# Patient Record
Sex: FEMALE | Race: BLACK OR AFRICAN AMERICAN | NOT HISPANIC OR LATINO | URBAN - METROPOLITAN AREA
[De-identification: names, ages, dates, MRNs, and addresses within clinical notes are randomized per-mention and may not be internally consistent; named-entity substitution may affect disease eponyms.]

---

## 2023-08-11 ENCOUNTER — OFFICE VISIT (OUTPATIENT)
Dept: URBAN - METROPOLITAN AREA CLINIC 86 | Facility: CLINIC | Age: 18
End: 2023-08-11
Payer: COMMERCIAL

## 2023-08-11 ENCOUNTER — WEB ENCOUNTER (OUTPATIENT)
Dept: URBAN - METROPOLITAN AREA CLINIC 86 | Facility: CLINIC | Age: 18
End: 2023-08-11

## 2023-08-11 VITALS
HEIGHT: 71 IN | TEMPERATURE: 98.2 F | HEART RATE: 98 BPM | WEIGHT: 250 LBS | SYSTOLIC BLOOD PRESSURE: 97 MMHG | DIASTOLIC BLOOD PRESSURE: 67 MMHG | BODY MASS INDEX: 35 KG/M2

## 2023-08-11 DIAGNOSIS — R74.8 ELEVATED LIVER ENZYMES: ICD-10-CM

## 2023-08-11 DIAGNOSIS — Z34.90 PREGNANCY, UNSPECIFIED GESTATIONAL AGE: ICD-10-CM

## 2023-08-11 DIAGNOSIS — Z71.85 VACCINE COUNSELING: ICD-10-CM

## 2023-08-11 PROCEDURE — 99214 OFFICE O/P EST MOD 30 MIN: CPT | Performed by: PHYSICIAN ASSISTANT

## 2023-08-11 NOTE — HPI-TODAY'S VISIT:
18 yo female previously seeing Dr. Dion Zamora for elevated liver enzymes in pregnancy presents today for further evaluation. A copy of the note will be sent to Dr. Dion Zamora as well as her OBGYN, .   She is seeing [place]    23 ov with Dr zamora ast 68 alt 111 tb 0.2. per notes startedin 2023. 30 weeks gestatation. hav negative igm, igg pos.  platlets 247  bile acids normal hcv negative ggt 11, inr 1.0 discussed the prenatal and taking alive and has beets in it an dmaybe try another asked about diet and she is working on this and was eating out a lto so could be affecting this she said the recent labs were lower need to do the weekly labs  recap 23 labs with ast 95 alt 159 tb   2023 us COMPARISON: None.   FINDINGS:    Liver: Normal echogenicity. No lesions. Portal vein is patent. Hepatic veins are patent.   Bile Ducts: No dilated intrahepatic biliary radicles. Common bile duct measures 4 mm.   Gallbladder: Normal. Mercer's sign is negative.   Pancreas: Obscured by overlying bowel gas.   Right Kidney: Measures 12.7 cm. Normal echogenicity. Mild pelvocaliectasis without iliana hydronephrosis.   Other: None.   IMPRESSION:   1. No evidence of acute cholecystitis or cholelithiasis. 2. Mild right pelvocaliectasis without iliana hydronephrosis.

## 2023-08-14 ENCOUNTER — LAB OUTSIDE AN ENCOUNTER (OUTPATIENT)
Dept: URBAN - METROPOLITAN AREA CLINIC 86 | Facility: CLINIC | Age: 18
End: 2023-08-14

## 2023-08-15 ENCOUNTER — WEB ENCOUNTER (OUTPATIENT)
Dept: URBAN - METROPOLITAN AREA CLINIC 86 | Facility: CLINIC | Age: 18
End: 2023-08-15

## 2023-08-15 ENCOUNTER — TELEPHONE ENCOUNTER (OUTPATIENT)
Dept: URBAN - METROPOLITAN AREA CLINIC 86 | Facility: CLINIC | Age: 18
End: 2023-08-15

## 2023-08-15 LAB
A/G RATIO: 1.4
ALBUMIN: 3.6
ALKALINE PHOSPHATASE: 108
ALT (SGPT): 96
AST (SGOT): 69
BASO (ABSOLUTE): 0.1
BASOS: 1
BILIRUBIN, TOTAL: 0.4
BUN/CREATININE RATIO: 10
BUN: 6
CALCIUM: 9.3
CARBON DIOXIDE, TOTAL: 17
CHLORIDE: 102
CREATININE: 0.62
EGFR: 131
EOS (ABSOLUTE): 0.1
EOS: 1
GLOBULIN, TOTAL: 2.6
GLUCOSE: 59
HEMATOCRIT: 38.5
HEMATOLOGY COMMENTS:: (no result)
HEMOGLOBIN: 12.8
IMMATURE CELLS: (no result)
IMMATURE GRANS (ABS): 0.1
IMMATURE GRANULOCYTES: 1
LYMPHS (ABSOLUTE): 2.2
LYMPHS: 24
MCH: 31.4
MCHC: 33.2
MCV: 94
MONOCYTES(ABSOLUTE): 0.6
MONOCYTES: 6
NEUTROPHILS (ABSOLUTE): 6.2
NEUTROPHILS: 67
NRBC: (no result)
PLATELETS: 249
POTASSIUM: 3.9
PROTEIN, TOTAL: 6.2
RBC: 4.08
RDW: 13.4
SODIUM: 136
WBC: 9.2

## 2023-08-15 NOTE — HPI-TODAY'S VISIT:
Dear Lili Wilkerson,   The recent labs were sent to us. The bilirubin 9.4, alkaline phosphatase 108, ast 69, alt 96. This is higher than before and we need to continue to monitor this. The platelets 249.  We will see what the next set shows.   Roseline White PA-C

## 2023-08-17 LAB
A/G RATIO: 1.3
ACTIN (SMOOTH MUSCLE) ANTIBODY: 25
ALBUMIN: 3.5
ALKALINE PHOSPHATASE: 104
ALPHA-1-ANTITRYPSIN, SERUM: 222
ALT (SGPT): 91
ANA DIRECT: NEGATIVE
AST (SGOT): 70
BASO (ABSOLUTE): 0.1
BASOS: 1
BILIRUBIN, TOTAL: 0.4
BUN/CREATININE RATIO: 12
BUN: 7
CALCIUM: 8.8
CARBON DIOXIDE, TOTAL: 19
CHLORIDE: 105
CREATININE: 0.58
EGFR: 134
EOS (ABSOLUTE): 0.1
EOS: 1
FERRITIN, SERUM: 147
GLOBULIN, TOTAL: 2.6
GLUCOSE: 65
HEMATOCRIT: 39.7
HEMATOLOGY COMMENTS:: (no result)
HEMOGLOBIN: 12.9
HEPATITIS B SURF AB QUANT: <3.1
IMMATURE CELLS: (no result)
IMMATURE GRANS (ABS): 0.1
IMMATURE GRANULOCYTES: 1
IMMUNOGLOBULIN A, QN, SERUM: 178
IMMUNOGLOBULIN E, TOTAL: 65
IMMUNOGLOBULIN G, QN, SERUM: 1048
IMMUNOGLOBULIN M, QN, SERUM: 91
INR: 1
IRON BIND.CAP.(TIBC): 467
IRON SATURATION: 18
IRON: 82
LYMPHS (ABSOLUTE): 2
LYMPHS: 23
MCH: 30.9
MCHC: 32.5
MCV: 95
MITOCHONDRIAL (M2) ANTIBODY: <20
MONOCYTES(ABSOLUTE): 0.6
MONOCYTES: 7
NEUTROPHILS (ABSOLUTE): 6.1
NEUTROPHILS: 67
NRBC: (no result)
PHENOTYPE (PI): (no result)
PLATELETS: 262
POTASSIUM: 4.2
PROTEIN, TOTAL: 6.1
PROTHROMBIN TIME: 10.4
RBC: 4.18
RDW: 13.7
SODIUM: 137
UIBC: 385
WBC: 8.9

## 2023-08-18 ENCOUNTER — OFFICE VISIT (OUTPATIENT)
Dept: URBAN - METROPOLITAN AREA TELEHEALTH 2 | Facility: TELEHEALTH | Age: 18
End: 2023-08-18
Payer: COMMERCIAL

## 2023-08-18 VITALS — WEIGHT: 250 LBS | BODY MASS INDEX: 35 KG/M2 | HEIGHT: 71 IN

## 2023-08-18 DIAGNOSIS — Z71.85 VACCINE COUNSELING: ICD-10-CM

## 2023-08-18 DIAGNOSIS — R74.8 ELEVATED LIVER ENZYMES: ICD-10-CM

## 2023-08-18 DIAGNOSIS — Z34.90 PREGNANCY, UNSPECIFIED GESTATIONAL AGE: ICD-10-CM

## 2023-08-18 PROBLEM — 707724006: Status: ACTIVE | Noted: 2023-08-18

## 2023-08-18 PROBLEM — 77386006: Status: ACTIVE | Noted: 2023-08-18

## 2023-08-18 PROBLEM — 443913008: Status: ACTIVE | Noted: 2023-08-18

## 2023-08-18 PROCEDURE — 99214 OFFICE O/P EST MOD 30 MIN: CPT

## 2023-08-18 NOTE — HPI-TODAY'S VISIT:
16 yo female previously seeing Dr. Dion Zamora for elevated liver enzymes in pregnancy presents today for further evaluation. A copy of the note will be sent to Dr. Dion Zamora as well as her OBGYN, Dr.   23 telemed  23 labs The bilirubin 0.4, alkaline phosphatase 108, ast 69, alt 96. This is higher than before and we need to continue to monitor this. The platelets 249.   immunoglobulins normal, tibc 467, iron 82, 18% saturation,ferritin 147, not immune to hbv, asma 25, ama negative, alpha 1 mm, johann negative, rbc 4.18, hgb 12.8, mcv 95, platelets 262, inr 1.0  seems she may have given the lab all of her slips last week so didn't do labs this week. she said she can go today to do them recap She is seeing [place]    23 ov with Dr zamora ast 68 alt 111 tb 0.2. per notes startedin 2023. 30 weeks gestatation. hav negative igm, igg pos.  platlets 247  bile acids normal hcv negative ggt 11, inr 1.0 discussed the prenatal and taking alive and has beets in it an dmaybe try another asked about diet and she is working on this and was eating out a lto so could be affecting this she said the recent labs were lower need to do the weekly labs  recap 23 labs with ast 95 alt 159 tb   2023 us COMPARISON: None.   FINDINGS:    Liver: Normal echogenicity. No lesions. Portal vein is patent. Hepatic veins are patent.   Bile Ducts: No dilated intrahepatic biliary radicles. Common bile duct measures 4 mm.   Gallbladder: Normal. Mercer's sign is negative.   Pancreas: Obscured by overlying bowel gas.   Right Kidney: Measures 12.7 cm. Normal echogenicity. Mild pelvocaliectasis without iliana hydronephrosis.   Other: None.   IMPRESSION:   1. No evidence of acute cholecystitis or cholelithiasis. 2. Mild right pelvocaliectasis without iliana hydronephrosis.

## 2023-08-22 ENCOUNTER — WEB ENCOUNTER (OUTPATIENT)
Dept: URBAN - METROPOLITAN AREA CLINIC 86 | Facility: CLINIC | Age: 18
End: 2023-08-22

## 2023-08-22 ENCOUNTER — TELEPHONE ENCOUNTER (OUTPATIENT)
Dept: URBAN - METROPOLITAN AREA CLINIC 86 | Facility: CLINIC | Age: 18
End: 2023-08-22

## 2023-08-22 LAB
A/G RATIO: 1.2
ALBUMIN: 3.5
ALKALINE PHOSPHATASE: 121
ALT (SGPT): 70
AST (SGOT): 54
BASO (ABSOLUTE): 0.1
BASOS: 1
BILIRUBIN, TOTAL: 0.4
BUN/CREATININE RATIO: 14
BUN: 8
CALCIUM: 8.9
CARBON DIOXIDE, TOTAL: 18
CHLORIDE: 102
CREATININE: 0.56
EGFR: 135
EOS (ABSOLUTE): 0.1
EOS: 1
GLOBULIN, TOTAL: 2.9
GLUCOSE: 56
HEMATOCRIT: 39.6
HEMATOLOGY COMMENTS:: (no result)
HEMOGLOBIN: 13.1
IMMATURE CELLS: (no result)
IMMATURE GRANS (ABS): 0.1
IMMATURE GRANULOCYTES: 1
LYMPHS (ABSOLUTE): 2.2
LYMPHS: 21
MCH: 31.4
MCHC: 33.1
MCV: 95
MONOCYTES(ABSOLUTE): 0.7
MONOCYTES: 7
NEUTROPHILS (ABSOLUTE): 7.3
NEUTROPHILS: 69
NRBC: (no result)
PLATELETS: 247
POTASSIUM: 4.1
PROTEIN, TOTAL: 6.4
RBC: 4.17
RDW: 13.5
SODIUM: 140
WBC: 10.4

## 2023-08-22 NOTE — HPI-TODAY'S VISIT:
Dear Lili,   The 8/21/23 labs were sent to me.  The glucose 56, and this is low and I suspect you are fasting but you may want to mention this to the OB/GYN.  Creatinine 0.56, sodium 140, potassium 4.1, alkaline phosphatase 121, AST 54, ALT 70, bilirubin 0.4.  The complete blood count was normal other than though neutrophils slightly elevated at 7.3 and upper limits of normal is 7.0.  This can happen with pregnancy.  Previously on August 14 the alkaline phosphatase 108, AST 69, ALT 96.  The AST and ALT are slightly lower now but curious as to why the alkaline phosphatase was a little bit higher and we can monitor this.  Thanks for doing the labs as this allows us to safely monitor you.  Roseline White PA-C

## 2023-08-25 ENCOUNTER — CLAIMS CREATED FROM THE CLAIM WINDOW (OUTPATIENT)
Dept: URBAN - METROPOLITAN AREA TELEHEALTH 2 | Facility: TELEHEALTH | Age: 18
End: 2023-08-25
Payer: COMMERCIAL

## 2023-08-25 VITALS — BODY MASS INDEX: 35 KG/M2 | WEIGHT: 250 LBS | HEIGHT: 71 IN

## 2023-08-25 DIAGNOSIS — R74.8 ELEVATED LIVER ENZYMES: ICD-10-CM

## 2023-08-25 DIAGNOSIS — Z34.90 PREGNANCY, UNSPECIFIED GESTATIONAL AGE: ICD-10-CM

## 2023-08-25 DIAGNOSIS — Z71.85 VACCINE COUNSELING: ICD-10-CM

## 2023-08-25 PROCEDURE — 99214 OFFICE O/P EST MOD 30 MIN: CPT

## 2023-08-25 PROCEDURE — 99214 OFFICE O/P EST MOD 30 MIN: CPT | Performed by: PHYSICIAN ASSISTANT

## 2023-08-25 NOTE — HPI-TODAY'S VISIT:
16 yo female previously seeing Dr. Dion Zamora for elevated liver enzymes in pregnancy presents today for further evaluation. A copy of the note will be sent to Dr. Dion Zamora as well as her OBGYN, .   23 labs were sent to me.  The glucose 56, and this is low and I suspect you are fasting but you may want to mention this to the OB/GYN.  Creatinine 0.56, sodium 140, potassium 4.1, alkaline phosphatase 121, AST 54, ALT 70, bilirubin 0.4.  The complete blood count was normal other than though neutrophils slightly elevated at 7.3 and upper limits of normal is 7.0 which can be seen inpregnancy but still recommend sharing [facility] obgyn/mfm Previously on  the alkaline phosphatase 108, AST 69, ALT 96.  The AST and ALT are slightly lower and alp up and this can due to the placenta as this can increase in pregnancy especially close to delivery which she is  She has fu with mfm next week on  She is trying to eat healthier cautioned re blood sugar and share wt mfm she is 34 weeks pregnant  recap 23 telemed  23 labs The bilirubin 0.4, alkaline phosphatase 108, ast 69, alt 96. This is higher than before and we need to continue to monitor this. The platelets 249.   immunoglobulins normal, tibc 467, iron 82, 18% saturation,ferritin 147, not immune to hbv, asma 25, ama negative, alpha 1 mm, johann negative, rbc 4.18, hgb 12.8, mcv 95, platelets 262, inr 1.0  seems she may have given the lab all of her slips last week so didn't do labs this week. she said she can go today to do them recap She is seeing [place]    23 ov with Dr zamora ast 68 alt 111 tb 0.2. per notes startedin 2023. 30 weeks gestatation. hav negative igm, igg pos.  platlets 247  bile acids normal hcv negative ggt 11, inr 1.0 discussed the prenatal and taking alive and has beets in it an dmaybe try another asked about diet and she is working on this and was eating out a lto so could be affecting this she said the recent labs were lower need to do the weekly labs  recap 23 labs with ast 95 alt 159 tb   2023 us COMPARISON: None.   FINDINGS:    Liver: Normal echogenicity. No lesions. Portal vein is patent. Hepatic veins are patent.   Bile Ducts: No dilated intrahepatic biliary radicles. Common bile duct measures 4 mm.   Gallbladder: Normal. Mercer's sign is negative.   Pancreas: Obscured by overlying bowel gas.   Right Kidney: Measures 12.7 cm. Normal echogenicity. Mild pelvocaliectasis without iliana hydronephrosis.   Other: None.   IMPRESSION:   1. No evidence of acute cholecystitis or cholelithiasis. 2. Mild right pelvocaliectasis without iliana hydronephrosis.

## 2023-08-28 ENCOUNTER — LAB OUTSIDE AN ENCOUNTER (OUTPATIENT)
Dept: URBAN - METROPOLITAN AREA TELEHEALTH 2 | Facility: TELEHEALTH | Age: 18
End: 2023-08-28

## 2023-08-31 ENCOUNTER — WEB ENCOUNTER (OUTPATIENT)
Dept: URBAN - METROPOLITAN AREA CLINIC 86 | Facility: CLINIC | Age: 18
End: 2023-08-31

## 2023-09-04 ENCOUNTER — LAB OUTSIDE AN ENCOUNTER (OUTPATIENT)
Dept: URBAN - METROPOLITAN AREA TELEHEALTH 2 | Facility: TELEHEALTH | Age: 18
End: 2023-09-04

## 2023-09-10 LAB
A/G RATIO: 1.4
ALBUMIN: 3.3
ALKALINE PHOSPHATASE: 143
ALT (SGPT): 33
AST (SGOT): 27
BASO (ABSOLUTE): 0.1
BASOS: 1
BILIRUBIN, TOTAL: 0.3
BUN/CREATININE RATIO: 13
BUN: 9
CALCIUM: 8.8
CARBON DIOXIDE, TOTAL: 18
CHLORIDE: 106
CREATININE: 0.69
EGFR: 128
EOS (ABSOLUTE): 0.1
EOS: 1
GLOBULIN, TOTAL: 2.4
GLUCOSE: 69
HEMATOCRIT: 39.8
HEMATOLOGY COMMENTS:: (no result)
HEMOGLOBIN: 13.1
IMMATURE CELLS: (no result)
IMMATURE GRANS (ABS): 0.1
IMMATURE GRANULOCYTES: 1
LYMPHS (ABSOLUTE): 2.2
LYMPHS: 23
MCH: 31.1
MCHC: 32.9
MCV: 95
MONOCYTES(ABSOLUTE): 0.6
MONOCYTES: 7
NEUTROPHILS (ABSOLUTE): 6.6
NEUTROPHILS: 67
NRBC: (no result)
PLATELETS: 235
POTASSIUM: 4.6
PROTEIN, TOTAL: 5.7
RBC: 4.21
RDW: 13.7
SODIUM: 140
WBC: 9.7

## 2023-09-11 ENCOUNTER — TELEPHONE ENCOUNTER (OUTPATIENT)
Dept: URBAN - METROPOLITAN AREA CLINIC 86 | Facility: CLINIC | Age: 18
End: 2023-09-11

## 2023-09-11 NOTE — HPI-TODAY'S VISIT:
Dear Lili,   The 9/7/23 labs were sent to me.  The white blood cells 9.7, hemoglobin 13, MCV 95, platelets 235 and these are normal.  The fasting blood sugar was 69 which is slightly low.  Recommend sharing this with the primary care and OB/GYN.  Creatinine 0.69, protein slightly low at 5.7 and that may mean you just need to increase your intake but I would recommend sharing this with the primary care and/or OB/GYN.  Bilirubin 0.3, alkaline phosphatase is 143, AST 27, ALT 33.  Goal for the AST and ALT is less than 25.  These are still lower than last time and happy to see this.  The alkaline phosphatase is slightly higher as previously it was 121 and this can be due to pregnancy as this increases later in pregnancy and we can review this at the follow-up.  Roseline White PA-C

## 2023-09-12 ENCOUNTER — OFFICE VISIT (OUTPATIENT)
Dept: URBAN - METROPOLITAN AREA TELEHEALTH 2 | Facility: TELEHEALTH | Age: 18
End: 2023-09-12
Payer: COMMERCIAL

## 2023-09-12 DIAGNOSIS — R74.8 ELEVATED LIVER ENZYMES: ICD-10-CM

## 2023-09-12 PROCEDURE — 99214 OFFICE O/P EST MOD 30 MIN: CPT | Performed by: PHYSICIAN ASSISTANT

## 2023-09-12 NOTE — HPI-TODAY'S VISIT:
18 yo female previously seeing Dr. Dion Zamora for elevated liver enzymes in pregnancy presents today for further evaluation. A copy of the note will be sent to Dr. Dion Zamora as well as her OBGYN  23/ telemed healow The 23 labs were sent to me.  The white blood cells 9.7, hemoglobin 13, MCV 95, platelets 235 and these are normal.  The fasting blood sugar was 69 which is slightly low.  Recommend sharing this with the primary care and OB/GYN.  Creatinine 0.69, protein slightly low at 5.7 and that may mean you just need to increase your intake but I would recommend sharing this with the primary care and/or OB/GYN.  Bilirubin 0.3, alkaline phosphatase is 143, AST 27, ALT 33.  Goal for the AST and ALT is less than 25.  These are still lower than last time and happy to see this.  The alkaline phosphatase is slightly higher as previously it was 121 and this can be due to pregnancy as this increases later in pregnancy and we can review this at the follow-up.  she had her baby on 23  she had him early, she and he are doing well.  she has peds appt today obgyn in 2 weeks need to get er cleared for mri when ok    recap 23 labs were sent to me.  The glucose 56, and this is low and I suspect you are fasting but you may want to mention this to the OB/GYN.  Creatinine 0.56, sodium 140, potassium 4.1, alkaline phosphatase 121, AST 54, ALT 70, bilirubin 0.4.  The complete blood count was normal other than though neutrophils slightly elevated at 7.3 and upper limits of normal is 7.0 which can be seen inpregnancy but still recommend sharing wt obgyn/mfm Previously on  the alkaline phosphatase 108, AST 69, ALT 96.  The AST and ALT are slightly lower and alp up and this can due to the placenta as this can increase in pregnancy especially close to delivery which she is  She has fu with mfm next week on  She is trying to eat healthier cautioned re blood sugar and share wtih mfm she is 34 weeks pregnant  recap 23 telemed  23 labs The bilirubin 0.4, alkaline phosphatase 108, ast 69, alt 96. This is higher than before and we need to continue to monitor this. The platelets 249.   immunoglobulins normal, tibc 467, iron 82, 18% saturation,ferritin 147, not immune to hbv, asma 25, ama negative, alpha 1 mm, johann negative, rbc 4.18, hgb 12.8, mcv 95, platelets 262, inr 1.0  seems she may have given the lab all of her slips last week so didn't do labs this week. she said she can go today to do them recap She is seeing [place]    23 ov with Dr zamora ast 68 alt 111 tb 0.2. per notes startedin 2023. 30 weeks gestatation. hav negative igm, igg pos.  platlets 247  bile acids normal hcv negative ggt 11, inr 1.0 discussed the prenatal and taking alive and has beets in it an dmaybe try another asked about diet and she is working on this and was eating out a lto so could be affecting this she said the recent labs were lower need to do the weekly labs  recap 23 labs with ast 95 alt 159 tb   2023 us COMPARISON: None.   FINDINGS:    Liver: Normal echogenicity. No lesions. Portal vein is patent. Hepatic veins are patent.   Bile Ducts: No dilated intrahepatic biliary radicles. Common bile duct measures 4 mm.   Gallbladder: Normal. Mercer's sign is negative.   Pancreas: Obscured by overlying bowel gas.   Right Kidney: Measures 12.7 cm. Normal echogenicity. Mild pelvocaliectasis without iliana hydronephrosis.   Other: None.   IMPRESSION:   1. No evidence of acute cholecystitis or cholelithiasis. 2. Mild right pelvocaliectasis without iliana hydronephrosis.

## 2023-09-26 ENCOUNTER — LAB OUTSIDE AN ENCOUNTER (OUTPATIENT)
Dept: URBAN - METROPOLITAN AREA TELEHEALTH 2 | Facility: TELEHEALTH | Age: 18
End: 2023-09-26

## 2023-09-29 ENCOUNTER — OFFICE VISIT (OUTPATIENT)
Dept: URBAN - METROPOLITAN AREA TELEHEALTH 2 | Facility: TELEHEALTH | Age: 18
End: 2023-09-29

## 2023-09-29 ENCOUNTER — TELEPHONE ENCOUNTER (OUTPATIENT)
Dept: URBAN - METROPOLITAN AREA CLINIC 86 | Facility: CLINIC | Age: 18
End: 2023-09-29

## 2023-11-01 ENCOUNTER — WEB ENCOUNTER (OUTPATIENT)
Dept: URBAN - METROPOLITAN AREA CLINIC 86 | Facility: CLINIC | Age: 18
End: 2023-11-01

## 2023-11-08 ENCOUNTER — OFFICE VISIT (OUTPATIENT)
Dept: URBAN - METROPOLITAN AREA CLINIC 86 | Facility: CLINIC | Age: 18
End: 2023-11-08

## 2023-11-08 NOTE — HPI-TODAY'S VISIT:
18 yo female previously seeing Dr. Dion Zamora for elevated liver enzymes in pregnancy presents today for further evaluation. A copy of the note will be sent to Dr. Dion Zamora as well as her OBGYN  23/ telemed healow The 23 labs were sent to me.  The white blood cells 9.7, hemoglobin 13, MCV 95, platelets 235 and these are normal.  The fasting blood sugar was 69 which is slightly low.  Recommend sharing this with the primary care and OB/GYN.  Creatinine 0.69, protein slightly low at 5.7 and that may mean you just need to increase your intake but I would recommend sharing this with the primary care and/or OB/GYN.  Bilirubin 0.3, alkaline phosphatase is 143, AST 27, ALT 33.  Goal for the AST and ALT is less than 25.  These are still lower than last time and happy to see this.  The alkaline phosphatase is slightly higher as previously it was 121 and this can be due to pregnancy as this increases later in pregnancy and we can review this at the follow-up.  she had her baby on 23  she had him early, she and he are doing well.  she has peds appt today obgyn in 2 weeks need to get er cleared for mri when ok    recap 23 labs were sent to me.  The glucose 56, and this is low and I suspect you are fasting but you may want to mention this to the OB/GYN.  Creatinine 0.56, sodium 140, potassium 4.1, alkaline phosphatase 121, AST 54, ALT 70, bilirubin 0.4.  The complete blood count was normal other than though neutrophils slightly elevated at 7.3 and upper limits of normal is 7.0 which can be seen inpregnancy but still recommend sharing wt obgyn/mfm Previously on  the alkaline phosphatase 108, AST 69, ALT 96.  The AST and ALT are slightly lower and alp up and this can due to the placenta as this can increase in pregnancy especially close to delivery which she is  She has fu with mfm next week on  She is trying to eat healthier cautioned re blood sugar and share wtih mfm she is 34 weeks pregnant  recap 23 telemed  23 labs The bilirubin 0.4, alkaline phosphatase 108, ast 69, alt 96. This is higher than before and we need to continue to monitor this. The platelets 249.   immunoglobulins normal, tibc 467, iron 82, 18% saturation,ferritin 147, not immune to hbv, asma 25, ama negative, alpha 1 mm, johann negative, rbc 4.18, hgb 12.8, mcv 95, platelets 262, inr 1.0  seems she may have given the lab all of her slips last week so didn't do labs this week. she said she can go today to do them recap She is seeing [place]    23 ov with Dr zamora ast 68 alt 111 tb 0.2. per notes startedin 2023. 30 weeks gestatation. hav negative igm, igg pos.  platlets 247  bile acids normal hcv negative ggt 11, inr 1.0 discussed the prenatal and taking alive and has beets in it an dmaybe try another asked about diet and she is working on this and was eating out a lto so could be affecting this she said the recent labs were lower need to do the weekly labs  recap 23 labs with ast 95 alt 159 tb   2023 us COMPARISON: None.   FINDINGS:    Liver: Normal echogenicity. No lesions. Portal vein is patent. Hepatic veins are patent.   Bile Ducts: No dilated intrahepatic biliary radicles. Common bile duct measures 4 mm.   Gallbladder: Normal. Mercer's sign is negative.   Pancreas: Obscured by overlying bowel gas.   Right Kidney: Measures 12.7 cm. Normal echogenicity. Mild pelvocaliectasis without iliana hydronephrosis.   Other: None.   IMPRESSION:   1. No evidence of acute cholecystitis or cholelithiasis. 2. Mild right pelvocaliectasis without iliana hydronephrosis.

## 2023-11-10 ENCOUNTER — TELEPHONE ENCOUNTER (OUTPATIENT)
Dept: URBAN - METROPOLITAN AREA CLINIC 86 | Facility: CLINIC | Age: 18
End: 2023-11-10

## 2023-11-14 ENCOUNTER — LAB OUTSIDE AN ENCOUNTER (OUTPATIENT)
Dept: URBAN - METROPOLITAN AREA TELEHEALTH 2 | Facility: TELEHEALTH | Age: 18
End: 2023-11-14

## 2023-11-14 ENCOUNTER — OFFICE VISIT (OUTPATIENT)
Dept: URBAN - METROPOLITAN AREA TELEHEALTH 2 | Facility: TELEHEALTH | Age: 18
End: 2023-11-14
Payer: COMMERCIAL

## 2023-11-14 ENCOUNTER — DASHBOARD ENCOUNTERS (OUTPATIENT)
Age: 18
End: 2023-11-14

## 2023-11-14 DIAGNOSIS — Z34.90 PREGNANCY, UNSPECIFIED GESTATIONAL AGE: ICD-10-CM

## 2023-11-14 DIAGNOSIS — R74.8 ELEVATED LIVER ENZYMES: ICD-10-CM

## 2023-11-14 PROCEDURE — 99214 OFFICE O/P EST MOD 30 MIN: CPT | Performed by: PHYSICIAN ASSISTANT

## 2023-11-14 NOTE — HPI-TODAY'S VISIT:
18 yo female previously seeing Dr. Dion Zamora for elevated liver enzymes in pregnancy presents today for further evaluation. A copy of the note will be sent to Dr. Dion Zamora as well as her OBGYN    23 telemed She is doing well Need to upddate labs she is planning to start ocp soon. sprintec.     RECAP 23/ telemed healow The 23 labs were sent to me.  The white blood cells 9.7, hemoglobin 13, MCV 95, platelets 235 and these are normal.  The fasting blood sugar was 69 which is slightly low.  Recommend sharing this with the primary care and OB/GYN.  Creatinine 0.69, protein slightly low at 5.7 and that may mean you just need to increase your intake but I would recommend sharing this with the primary care and/or OB/GYN.  Bilirubin 0.3, alkaline phosphatase is 143, AST 27, ALT 33.  Goal for the AST and ALT is less than 25.  These are still lower than last time and happy to see this.  The alkaline phosphatase is slightly higher as previously it was 121 and this can be due to pregnancy as this increases later in pregnancy and we can review this at the follow-up.  she had her baby on 23  she had him early, she and he are doing well.  she has peds appt today obgyn in 2 weeks  23 labs were sent to me.  The glucose 56, and this is low and I suspect you are fasting but you may want to mention this to the OB/GYN.  Creatinine 0.56, sodium 140, potassium 4.1, alkaline phosphatase 121, AST 54, ALT 70, bilirubin 0.4.  The complete blood count was normal other than though neutrophils slightly elevated at 7.3 and upper limits of normal is 7.0 which can be seen inpregnancy but still recommend sharing wt obgyn/mfm Previously on  the alkaline phosphatase 108, AST 69, ALT 96.  The AST and ALT are slightly lower and alp up and this can due to the placenta as this can increase in pregnancy especially close to delivery which she is  She has fu with mfm next week on  She is trying to eat healthier cautioned re blood sugar and share wtih mfm she is 34 weeks pregnant  23 telemed  23 labs The bilirubin 0.4, alkaline phosphatase 108, ast 69, alt 96. This is higher than before and we need to continue to monitor this. The platelets 249.   immunoglobulins normal, tibc 467, iron 82, 18% saturation,ferritin 147, not immune to hbv, asma 25, ama negative, alpha 1 mm, johann negative, rbc 4.18, hgb 12.8, mcv 95, platelets 262, inr 1.0  seems she may have given the lab all of her slips last week so didn't do labs this week. she said she can go today to do them recap She is seeing [place]    23 ov with Dr zamora ast 68 alt 111 tb 0.2. per notes startedin 2023. 30 weeks gestatation. hav negative igm, igg pos.  platlets 247  bile acids normal hcv negative ggt 11, inr 1.0 discussed the prenatal and taking alive and has beets in it an dmaybe try another asked about diet and she is working on this and was eating out a lto so could be affecting this she said the recent labs were lower need to do the weekly labs  recap 23 labs with ast 95 alt 159 tb   2023 us COMPARISON: None.   FINDINGS:    Liver: Normal echogenicity. No lesions. Portal vein is patent. Hepatic veins are patent.   Bile Ducts: No dilated intrahepatic biliary radicles. Common bile duct measures 4 mm.   Gallbladder: Normal. Mercer's sign is negative.   Pancreas: Obscured by overlying bowel gas.   Right Kidney: Measures 12.7 cm. Normal echogenicity. Mild pelvocaliectasis without iliana hydronephrosis.   Other: None.   IMPRESSION:   1. No evidence of acute cholecystitis or cholelithiasis. 2. Mild right pelvocaliectasis without iliana hydronephrosis.

## 2023-11-20 ENCOUNTER — OFFICE VISIT (OUTPATIENT)
Dept: URBAN - METROPOLITAN AREA CLINIC 114 | Facility: CLINIC | Age: 18
End: 2023-11-20

## 2023-11-30 ENCOUNTER — OFFICE VISIT (OUTPATIENT)
Dept: URBAN - METROPOLITAN AREA TELEHEALTH 2 | Facility: TELEHEALTH | Age: 18
End: 2023-11-30

## 2023-11-30 ENCOUNTER — TELEPHONE ENCOUNTER (OUTPATIENT)
Dept: URBAN - METROPOLITAN AREA CLINIC 86 | Facility: CLINIC | Age: 18
End: 2023-11-30

## 2023-11-30 NOTE — HPI-TODAY'S VISIT:
16 yo female previously seeing Dr. Dion Zamora for elevated liver enzymes in pregnancy presents today for further evaluation. A copy of the note will be sent to Dr. Dion Zamora as well as her OBGYN    23 telemed She is doing well Need to upddate labs she is planning to start ocp soon. sprintec.     RECAP 23/ telemed healow The 23 labs were sent to me.  The white blood cells 9.7, hemoglobin 13, MCV 95, platelets 235 and these are normal.  The fasting blood sugar was 69 which is slightly low.  Recommend sharing this with the primary care and OB/GYN.  Creatinine 0.69, protein slightly low at 5.7 and that may mean you just need to increase your intake but I would recommend sharing this with the primary care and/or OB/GYN.  Bilirubin 0.3, alkaline phosphatase is 143, AST 27, ALT 33.  Goal for the AST and ALT is less than 25.  These are still lower than last time and happy to see this.  The alkaline phosphatase is slightly higher as previously it was 121 and this can be due to pregnancy as this increases later in pregnancy and we can review this at the follow-up.  she had her baby on 23  she had him early, she and he are doing well.  she has peds appt today obgyn in 2 weeks  23 labs were sent to me.  The glucose 56, and this is low and I suspect you are fasting but you may want to mention this to the OB/GYN.  Creatinine 0.56, sodium 140, potassium 4.1, alkaline phosphatase 121, AST 54, ALT 70, bilirubin 0.4.  The complete blood count was normal other than though neutrophils slightly elevated at 7.3 and upper limits of normal is 7.0 which can be seen inpregnancy but still recommend sharing wt obgyn/mfm Previously on  the alkaline phosphatase 108, AST 69, ALT 96.  The AST and ALT are slightly lower and alp up and this can due to the placenta as this can increase in pregnancy especially close to delivery which she is  She has fu with mfm next week on  She is trying to eat healthier cautioned re blood sugar and share wtih mfm she is 34 weeks pregnant  23 telemed  23 labs The bilirubin 0.4, alkaline phosphatase 108, ast 69, alt 96. This is higher than before and we need to continue to monitor this. The platelets 249.   immunoglobulins normal, tibc 467, iron 82, 18% saturation,ferritin 147, not immune to hbv, asma 25, ama negative, alpha 1 mm, johann negative, rbc 4.18, hgb 12.8, mcv 95, platelets 262, inr 1.0  seems she may have given the lab all of her slips last week so didn't do labs this week. she said she can go today to do them recap She is seeing [place]    23 ov with Dr zamora ast 68 alt 111 tb 0.2. per notes startedin 2023. 30 weeks gestatation. hav negative igm, igg pos.  platlets 247  bile acids normal hcv negative ggt 11, inr 1.0 discussed the prenatal and taking alive and has beets in it an dmaybe try another asked about diet and she is working on this and was eating out a lto so could be affecting this she said the recent labs were lower need to do the weekly labs  recap 23 labs with ast 95 alt 159 tb   2023 us COMPARISON: None.   FINDINGS:    Liver: Normal echogenicity. No lesions. Portal vein is patent. Hepatic veins are patent.   Bile Ducts: No dilated intrahepatic biliary radicles. Common bile duct measures 4 mm.   Gallbladder: Normal. Mercer's sign is negative.   Pancreas: Obscured by overlying bowel gas.   Right Kidney: Measures 12.7 cm. Normal echogenicity. Mild pelvocaliectasis without iliana hydronephrosis.   Other: None.   IMPRESSION:   1. No evidence of acute cholecystitis or cholelithiasis. 2. Mild right pelvocaliectasis without iliana hydronephrosis.

## 2023-11-30 NOTE — HPI-TODAY'S VISIT:
Dear Lili Kidd,  I tried to call you for the telemedicine visit this morning, 11/30/23, but the number stated it was not in service. Do you have an alternate number we can contact? Please let us know. We need to get the ultrasound rescheduled and your visit today. Please call [phone] and use extension [phone] for my medical assistant Mauri and she can assist with this matter.   Roseline White PA-C

## 2025-06-14 NOTE — PHYSICAL EXAM LYMPHATIC:
Problem: Hematologic - Adult  Goal: Maintains hematologic stability  Outcome: Progressing     Pt in bed, appears agitated and verbally expresses concerns re: her dinner. Pt had had her dinner remade today per day RN report. Pt c/o chicken and states that she does not like chicken breast but prefers chicken thighs. Pt unable to focus on RN's physical assessment questions at this time, including if she is having any pain. Pt hyperverbal and day RN unable to obtain oral temperature at shift change at 1945. Axillary Temperature obtained. BP elevated at this time 165/79; will recheck tonight when pt more calm. RN attempted therapeutic communication to de-escalate pt's agitation. Pt agreed to attempt to eat more dinner. Will continue to monitor.      06/13/25 1945   Vitals   Temp 97.4 °F (36.3 °C)   Temp Source Axillary   Pulse 68   Heart Rate Source Brachial;Radial   Respirations 16   BP (!) 165/79   MAP (Calculated) 108   BP Location Left upper arm   BP Upper/Lower Upper   BP Method Automatic   Patient Position Semi fowlers   Cardiac Rhythm 1° AV Block;BBB;Sinus rhythm with PVC   Pain Assessment   Pain Assessment Wyman-Mtz FACES   Pain Level 0   Opioid-Induced Sedation   POSS Score 1   Oxygen Therapy   SpO2 100 %   Pulse Oximeter Device Mode Intermittent   Pulse Oximeter Device Location Finger   O2 Device None (Room air)      Neck , no lymphadenopathy